# Patient Record
(demographics unavailable — no encounter records)

---

## 2019-02-08 NOTE — ER DOCUMENT REPORT
HPI





- HPI


Patient complains to provider of: Cough


Time Seen by Provider: 02/08/19 11:10


Onset: Yesterday


Onset/Duration: Gradual


Quality of pain: Achy


Pain Level: 3


Context: 





Patient presents complaining of cough and sore throat that started yesterday.  

Patient does report multiple sick contacts in the household.  Patient denies any

fever nausea or vomiting.


Associated Symptoms: Body/muscle aches, Nonproductive cough, Rhinnorhea, Sore 

throat.  denies: Earache, Fever, Vomiting


Exacerbated by: Denies


Relieved by: Denies


Similar symptoms previously: Yes


Recently seen / treated by doctor: No





- ROS


ROS below otherwise negative: Yes


Systems Reviewed and Negative: Yes All other systems reviewed and negative





- CONSTITUTIONAL


Constitutional: DENIES: Fever, Chills





- EENT


EENT: REPORTS: Sore Throat, Congestion





- RESPIRATORY


Respiratory: REPORTS: Coughing





- GASTROINTESTINAL


Gastrointestinal: DENIES: Nausea, Patient vomiting, Diarrhea





- REPRODUCTIVE


Reproductive: DENIES: Pregnant:





- DERM


Skin Color: Normal


Skin Problems: None





Past Medical History





- General


Information source: Patient





- Social History


Smoking Status: Current Every Day Smoker


Chew tobacco use (# tins/day): No


Smoking Education Provided: Yes


Frequency of alcohol use: None


Drug Abuse: None


Occupation: None


Family History: Reviewed & Not Pertinent


Patient has suicidal ideation: No


Patient has homicidal ideation: No


Pulmonary Medical History: Reports: Hx Asthma


Renal/ Medical History: Denies: Hx Peritoneal Dialysis


Psychiatric Medical History: Reports: Hx Post Traumatic Stress Disorder


Past Surgical History: Reports: Hx Oral Surgery, Hx Tonsillectomy





Vertical Provider Document





- CONSTITUTIONAL


Agree With Documented VS: Yes


Exam Limitations: No Limitations


General Appearance: WD/WN, No Apparent Distress





- INFECTION CONTROL


TRAVEL OUTSIDE OF THE [place] IN LAST 30 DAYS: No





- HEENT


HEENT: Atraumatic, Normocephalic, Pharyngeal Tenderness, Pharyngeal Erythema.  

negative: Pharyngeal Exudate, Tympanic Membrane Red, Tympanic Membrane Bulging





- NECK


Neck: Normal Inspection, Supple.  negative: Lymphadenopathy-Left, 

Lymphadenopathy-Right





- RESPIRATORY


Respiratory: Breath Sounds Normal, No Respiratory Distress, Chest Non-Tender





- CARDIOVASCULAR


Cardiovascular: Regular Rate, Regular Rhythm, No Murmur





- GI/ABDOMEN


Gastrointestinal: Abdomen Soft





- BACK


Back: Normal Inspection





- MUSCULOSKELETAL/EXTREMETIES


Musculoskeletal/Extremeties: MAEW, FROM





- NEURO


Level of Consciousness: Awake, Alert, Appropriate


Motor/Sensory: No Motor Deficit





- DERM


Integumentary: Warm, Dry, No Rash





Course





- Re-evaluation


Re-evalutation: 





02/08/19 12:02


Patient without any airway compromise, patient with negative strep test.  No 

concern for peritonsillar abscess.  Patient very well-appearing otherwise.  Good

return precautions discussed.





- Vital Signs


Vital signs: 


                                        











Temp Pulse Resp BP Pulse Ox


 


 98.3 F   81   16   114/70   98 


 


 02/08/19 11:04  02/08/19 11:04  02/08/19 11:04  02/08/19 11:04  02/08/19 11:04














- Laboratory


Laboratory results interpreted by me: 





02/08/19 12:02


                               Labs- Entire Visit











  02/08/19





  11:12


 


Group A Strep Rapid  NEGATIVE














Discharge





- Discharge


Clinical Impression: 


 Sore throat





Upper respiratory infection


Qualifiers:


 URI type: unspecified URI Qualified Code(s): J06.9 - Acute upper respiratory 

infection, unspecified





Condition: Stable


Disposition: HOME, SELF-CARE


Instructions:  Acetaminophen, Sore Throat ([facility]), Upper Respiratory Illness ([facility])


Additional Instructions: 


Return immediately for any new or worsening symptoms





Followup with your primary care provider, call tomorrow to make a followup 

appointment





Throat culture is pending, we will call if you need any different treatment


Prescriptions: 


Guaifenesin/Pseudoephedrne HCl [Mucinex D ER Tablet] 1 each PO Q12 PRN #12 

tab.er.12h


 PRN Reason: 


Naproxen [Naprosyn 250 Nmg Tablet] 1 tab PO BID #14 tablet


Forms:  Smoking Cessation Education


Referrals: 


RACHEL WILLS DO [NO LOCAL MD] - Follow up as needed

## 2019-02-13 NOTE — RADIOLOGY REPORT (SQ)
EXAM DESCRIPTION:  CHEST 2 VIEWS



COMPLETED DATE/TIME:  2/13/2019 6:22 pm



REASON FOR STUDY:  cough congestion for 2 weeks



COMPARISON:  5/10/2008



EXAM PARAMETERS:  NUMBER OF VIEWS: two views

TECHNIQUE: Digital Frontal and Lateral radiographic views of the chest acquired.

RADIATION DOSE: NA

LIMITATIONS: none



FINDINGS:  LUNGS AND PLEURA: No opacities, masses or pneumothorax. No pleural effusion.

MEDIASTINUM AND HILAR STRUCTURES: No masses or contour abnormalities.

HEART AND VASCULAR STRUCTURES: Heart normal size.  No evidence for failure.

BONES: No acute findings.

HARDWARE: None in the chest.

OTHER: No other significant finding.



IMPRESSION:  NO ACUTE RADIOGRAPHIC FINDING IN THE CHEST.



TECHNICAL DOCUMENTATION:  JOB ID:  [id number]

 2011 [employer]- All Rights Reserved



Reading location - IP/workstation name: [email or web address]

## 2019-02-13 NOTE — ER DOCUMENT REPORT
ED Respiratory Problem





- General


Chief Complaint: Cough


Stated Complaint: RESPIRATORY SYMTPOMS


Time Seen by Provider: 02/13/19 17:47


Mode of Arrival: Ambulatory


Information source: Patient


Notes: 





19-year-old female presented to ED for complaint of cough cold congestion sore 

throat times 2 weeks.  She states she was seen here about a week ago and was 

diagnosed with a upper respiratory infection.  She states she is getting worse. 

She denies any fevers.  She states is been multiple family members who have had 

this similar illnesses.  She denies any nausea or vomiting.  She does states she

has body aches with nonproductive cough.  Patient is alert and oriented 

respirations regular and unlabored speaking in full sentences walks with a even 

steady gait.


TRAVEL OUTSIDE OF THE [place] IN LAST 30 DAYS: No





- HPI


Patient complains to provider of: Cough


Onset: Other - 2 weeks


Initiating Event: URI


Quality of pain: Achy


Severity: Moderate


Pain Level: 4


Cough: Nonproductive


Sputum amount: None


Associated symptoms: Congestion, Cough, PND, Sore Throat.  denies: Fever, 

Wheezing


Similar symptoms previously: Yes


Recently seen / treated by doctor: Yes





- Related Data


Allergies/Adverse Reactions: 


                                        





No Known Allergies Allergy (Verified 02/13/19 17:14)


   











Past Medical History





- General


Information source: Patient





- Social History


Smoking Status: Current Every Day Smoker - Vapor cigarettes no tobacco 

cigarettes


Frequency of alcohol use: None


Drug Abuse: None


Lives with: Family


Family History: Reviewed & Not Pertinent


Patient has suicidal ideation: No


Patient has homicidal ideation: No





- Past Medical History


Cardiac Medical History: Reports: None


Pulmonary Medical History: Reports: Hx Asthma


EENT Medical History: Reports: None


Neurological Medical History: Reports: None


Endocrine Medical History: Reports: None


Renal/ Medical History: Reports: None


Malignancy Medical History: Reports: None


GI Medical History: Reports: None


Musculoskeletal Medical History: Reports Hx Musculoskeletal Trauma


Skin Medical History: Reports None


Psychiatric Medical History: Reports: Hx Attention Deficit Hyperactivity 

Disorder, Hx Bipolar Disorder, Hx Depression, Hx Post Traumatic Stress Disorder


Traumatic Medical History: Reports: None


Infectious Medical History: Reports: None


Past Surgical History: Reports: Hx Oral Surgery - wisdom, Hx Tonsillectomy





- Immunizations


Immunizations up to date: Yes


Hx Diphtheria, Pertussis, Tetanus Vaccination: Yes





Review of Systems





- Review of Systems


Constitutional: Recent illness


EENT: Nose discharge, Sinus pressure, Sinus discharge, Throat pain


Cardiovascular: No symptoms reported


Respiratory: Cough


Gastrointestinal: No symptoms reported


Genitourinary: No symptoms reported


Female Genitourinary: No symptoms reported


Musculoskeletal: No symptoms reported


Skin: No symptoms reported


Hematologic/Lymphatic: No symptoms reported


Neurological/Psychological: No symptoms reported


-: Yes All other systems reviewed and negative





Physical Exam





- Vital signs


Vitals: 


                                        











Temp Pulse Resp BP Pulse Ox


 


 98.1 F   95 H  16   125/70   100 


 


 02/13/19 17:17  02/13/19 17:17  02/13/19 17:17  02/13/19 17:17  02/13/19 17:17











Interpretation: Normal





- General


General appearance: Appears well, Alert





- HEENT


Head: Normocephalic, Atraumatic


Eyes: Normal


Pupils: PERRL


Ears: Normal


External canal: Normal


Tympanic membrane: Normal


Sinus: Normal


Nasal: Purulent discharge, Swelling


Mouth/Lips: Normal


Mucous membranes: Normal


Pharynx: Post nasal drainage


Neck: Normal





- Respiratory


Respiratory status: No respiratory distress


Chest status: Nontender


Breath sounds: Normal


Chest palpation: Normal





- Cardiovascular


Rhythm: Regular


Heart sounds: Normal auscultation


Murmur: No





- Abdominal


Inspection: Normal


Distension: No distension


Bowel sounds: Normal


Tenderness: Nontender


Organomegaly: No organomegaly





- Back


Back: Normal, Nontender





- Extremities


General upper extremity: Normal inspection, Nontender, Normal color, Normal ROM,

Normal temperature


General lower extremity: Normal inspection, Nontender, Normal color, Normal ROM,

Normal temperature, Normal weight bearing.  No: Lesia's sign





- Neurological


Neuro grossly intact: Yes


Cognition: Normal


Orientation: AAOx4


Bryan Coma Scale Eye Opening: Spontaneous


[place] Coma Scale Verbal: Oriented


Bryan Coma Scale Motor: Obeys Commands


Bryan Coma Scale Total: 15


Speech: Normal


Motor strength normal: LUE, RUE, LLE, RLE


Sensory: Normal





- Psychological


Associated symptoms: Normal affect, Normal mood





- Skin


Skin Temperature: Warm


Skin Moisture: Dry


Skin Color: Normal





Course





- Re-evaluation


Re-evalutation: 





02/13/19 19:26


While waiting for the results of the chest x-ray patient decided she could not 

wait any longer and went home.  Chest x-ray was negative.  Patient was medicated

with Claritin and Sudafed and Mucinex and Tylenol.  Patient was given 

instructions earlier on upper respiratory infection and need to follow-up with 

her primary doctor.





- Vital Signs


Vital signs: 


                                        











Temp Pulse Resp BP Pulse Ox


 


 98.1 F   95 H  16   125/70   100 


 


 02/13/19 17:17  02/13/19 17:17  02/13/19 17:17  02/13/19 17:17  02/13/19 17:17














- Diagnostic Test


Radiology reviewed: Image reviewed, Reports reviewed





Discharge





- Discharge


Clinical Impression: 


Upper respiratory infection


Qualifiers:


 URI type: unspecified viral URI Qualified Code(s): J06.9 - Acute upper 

respiratory infection, unspecified





Condition: Stable


Disposition: ELOPED